# Patient Record
(demographics unavailable — no encounter records)

---

## 2024-11-22 NOTE — ASSESSMENT
[FreeTextEntry1] : PHN L V1 with recurrence of pain. Now with persistent pain despite increased dose.   - Recommend follow up with Ophtho for funduscopic exam  - gabapentin from 300mg 2x/day ( mild grogginess in day).  Plan to taper by 100 mg q 2-3 weeks.  f/u in 3-6 months or sooner if needed.     The patient and her daughter Katarina had the opportunity to ask questions and to provide feedback. All questions were answered accordingly.  The patient verbalized understanding of the management plan.

## 2024-11-22 NOTE — HISTORY OF PRESENT ILLNESS
[FreeTextEntry1] : LON RAMAN is a 61 year female with a Pmhx of Herpetic neuralgia (Left V1) who presents for follow up.  She was using gabapentin as well as herbal supplements with significant improvement / complete resolution of pain x 1 year.  Since her last visit she reports significant improvement in symptoms with increase dose of gabapentin. No pain from September 2024 up until single episode yesterday. Patient reports she opened door and cold air hit her and developed severe sharp shooting or burning pain right eye 10/10 lasting a few seconds.   Denies HA, visual changes.   Allergies: NKDA  Prior medications: Current medications : Gabapentin 300mg 2x/day. Vitamin B complex, Alphalipoic acid 600 mg 2x/day, L carnitine 500mg 2x/day, CoQ10